# Patient Record
Sex: MALE | Race: WHITE | NOT HISPANIC OR LATINO
[De-identification: names, ages, dates, MRNs, and addresses within clinical notes are randomized per-mention and may not be internally consistent; named-entity substitution may affect disease eponyms.]

---

## 2017-11-06 ENCOUNTER — NEW PATIENT (OUTPATIENT)
Age: 52
End: 2017-11-06

## 2017-11-06 DIAGNOSIS — H25.13: ICD-10-CM

## 2017-11-06 DIAGNOSIS — H35.372: ICD-10-CM

## 2017-11-06 DIAGNOSIS — Z98.890: ICD-10-CM

## 2017-11-06 PROCEDURE — 99204 OFFICE O/P NEW MOD 45 MIN: CPT

## 2017-11-06 ASSESSMENT — VISUAL ACUITY
OS_PH: 20/100-2
OS_SC: 20/150+1
OD_GLARE: 20/25
OD_SC: 20/25-1

## 2017-11-06 ASSESSMENT — TONOMETRY
OD_IOP_MMHG: 18
OS_IOP_MMHG: 11

## 2017-12-04 ENCOUNTER — DIAGNOSTICS ONLY (OUTPATIENT)
Age: 52
End: 2017-12-04

## 2017-12-04 DIAGNOSIS — H25.13: ICD-10-CM

## 2017-12-04 DIAGNOSIS — H35.372: ICD-10-CM

## 2017-12-04 PROCEDURE — 92134 CPTRZ OPH DX IMG PST SGM RTA: CPT

## 2017-12-04 PROCEDURE — 92136 OPHTHALMIC BIOMETRY: CPT

## 2017-12-04 ASSESSMENT — KERATOMETRY
OS_AXISANGLE_DEGREES: 153
OD_K1POWER_DIOPTERS: 38.00
OS_AXISANGLE2_DEGREES: 063
OD_K2POWER_DIOPTERS: 38.75
OD_AXISANGLE_DEGREES: 42
OS_K1POWER_DIOPTERS: 38.00
OS_K2POWER_DIOPTERS: 38.75
OD_AXISANGLE2_DEGREES: 132

## 2017-12-05 ASSESSMENT — KERATOMETRY
OD_AXISANGLE2_DEGREES: 132
OD_K1POWER_DIOPTERS: 38.00
OS_K1POWER_DIOPTERS: 38.00
OS_K2POWER_DIOPTERS: 38.75
OD_AXISANGLE_DEGREES: 42
OS_AXISANGLE_DEGREES: 153
OD_K2POWER_DIOPTERS: 38.75
OS_AXISANGLE2_DEGREES: 063

## 2017-12-07 ENCOUNTER — SURGERY/PROCEDURE (OUTPATIENT)
Age: 52
End: 2017-12-07

## 2017-12-07 DIAGNOSIS — H25.812: ICD-10-CM

## 2017-12-07 PROCEDURE — 66984 XCAPSL CTRC RMVL W/O ECP: CPT

## 2018-02-14 ENCOUNTER — POST-OP CHECK (OUTPATIENT)
Age: 53
End: 2018-02-14

## 2018-02-14 DIAGNOSIS — Z96.1: ICD-10-CM

## 2018-02-14 DIAGNOSIS — H52.13: ICD-10-CM

## 2018-02-14 PROCEDURE — 92015 DETERMINE REFRACTIVE STATE: CPT

## 2018-02-14 PROCEDURE — 99024 POSTOP FOLLOW-UP VISIT: CPT

## 2018-02-14 ASSESSMENT — KERATOMETRY
OD_AXISANGLE2_DEGREES: 135
OS_K2POWER_DIOPTERS: 38.75
OS_K1POWER_DIOPTERS: 38.25
OS_AXISANGLE2_DEGREES: 063
OD_K1POWER_DIOPTERS: 38.25
OS_AXISANGLE_DEGREES: 153
OD_K2POWER_DIOPTERS: 39.00
OD_AXISANGLE_DEGREES: 45

## 2018-02-14 ASSESSMENT — VISUAL ACUITY
OD_PH: 20/30
OD_SC: 20/40+1
OS_SC: J16
OD_SC: J2
OS_SC: 20/150-1
OS_PH: 20/150+1

## 2018-02-14 ASSESSMENT — TONOMETRY
OS_IOP_MMHG: 10
OD_IOP_MMHG: 10

## 2019-02-25 ENCOUNTER — ESTABLISHED (OUTPATIENT)
Age: 54
End: 2019-02-25

## 2019-02-25 DIAGNOSIS — H25.11: ICD-10-CM

## 2019-02-25 DIAGNOSIS — Z98.890: ICD-10-CM

## 2019-02-25 DIAGNOSIS — H35.372: ICD-10-CM

## 2019-02-25 DIAGNOSIS — H25.812: ICD-10-CM

## 2019-02-25 DIAGNOSIS — H52.13: ICD-10-CM

## 2019-02-25 DIAGNOSIS — Z96.1: ICD-10-CM

## 2019-02-25 PROCEDURE — 92015 DETERMINE REFRACTIVE STATE: CPT

## 2019-02-25 PROCEDURE — 99214 OFFICE O/P EST MOD 30 MIN: CPT

## 2019-02-25 ASSESSMENT — TONOMETRY
OS_IOP_MMHG: 11
OD_IOP_MMHG: 11

## 2019-02-25 ASSESSMENT — VISUAL ACUITY
OS_CC: 20/70-2
OS_SC: 20/100
OD_CC: 20/25-2
OD_SC: 20/30-1

## 2019-07-10 ENCOUNTER — APPOINTMENT (RX ONLY)
Dept: URBAN - METROPOLITAN AREA CLINIC 37 | Facility: CLINIC | Age: 54
Setting detail: DERMATOLOGY
End: 2019-07-10

## 2019-07-10 DIAGNOSIS — Z85.828 PERSONAL HISTORY OF OTHER MALIGNANT NEOPLASM OF SKIN: ICD-10-CM

## 2019-07-10 DIAGNOSIS — D22 MELANOCYTIC NEVI: ICD-10-CM

## 2019-07-10 DIAGNOSIS — L72.8 OTHER FOLLICULAR CYSTS OF THE SKIN AND SUBCUTANEOUS TISSUE: ICD-10-CM

## 2019-07-10 DIAGNOSIS — L91.8 OTHER HYPERTROPHIC DISORDERS OF THE SKIN: ICD-10-CM

## 2019-07-10 PROBLEM — D48.5 NEOPLASM OF UNCERTAIN BEHAVIOR OF SKIN: Status: ACTIVE | Noted: 2019-07-10

## 2019-07-10 PROBLEM — I10 ESSENTIAL (PRIMARY) HYPERTENSION: Status: ACTIVE | Noted: 2019-07-10

## 2019-07-10 PROBLEM — D22.5 MELANOCYTIC NEVI OF TRUNK: Status: ACTIVE | Noted: 2019-07-10

## 2019-07-10 PROBLEM — D23.72 OTHER BENIGN NEOPLASM OF SKIN OF LEFT LOWER LIMB, INCLUDING HIP: Status: ACTIVE | Noted: 2019-07-10

## 2019-07-10 PROBLEM — Z85.79 PERSONAL HISTORY OF OTHER MALIGNANT NEOPLASMS OF LYMPHOID, HEMATOPOIETIC AND RELATED TISSUES: Status: ACTIVE | Noted: 2019-07-10

## 2019-07-10 PROCEDURE — 11102 TANGNTL BX SKIN SINGLE LES: CPT

## 2019-07-10 PROCEDURE — ? BIOPSY BY SHAVE METHOD

## 2019-07-10 PROCEDURE — 99203 OFFICE O/P NEW LOW 30 MIN: CPT | Mod: 25

## 2019-07-10 PROCEDURE — ? COUNSELING

## 2019-07-10 ASSESSMENT — LOCATION ZONE DERM
LOCATION ZONE: NECK
LOCATION ZONE: TRUNK

## 2019-07-10 ASSESSMENT — LOCATION SIMPLE DESCRIPTION DERM
LOCATION SIMPLE: NECK
LOCATION SIMPLE: RIGHT UPPER BACK

## 2019-07-10 ASSESSMENT — LOCATION DETAILED DESCRIPTION DERM
LOCATION DETAILED: RIGHT SUPERIOR UPPER BACK
LOCATION DETAILED: RIGHT SUPERIOR POSTERIOR NECK

## 2019-07-10 NOTE — PROCEDURE: BIOPSY BY SHAVE METHOD
Silver Nitrate Text: The wound bed was treated with silver nitrate after the biopsy was performed.
Destruction After The Procedure: No
Size Of Lesion In Cm: 0
Electrodesiccation Text: The wound bed was treated with electrodesiccation after the biopsy was performed.
Consent: Verbal consent was obtained and risks were reviewed including but not limited to scarring, infection, bleeding, scabbing, incomplete removal, nerve damage and allergy to anesthesia. Also risk that hair will not grow through area.
Dressing: bandage
Depth Of Biopsy: dermis
Billing Type: Third-Party Bill
Hemostasis: Electrodesiccation
Lab: [id number]
Electrodesiccation And Curettage Text: The wound bed was treated with electrodesiccation and curettage after the biopsy was performed.
Biopsy Method: Acu-Razor
Biopsy Type: H and E
Render Post-Care Instructions In Note?: yes
Notification Instructions: Patient will be notified of biopsy results. However, patient instructed to call the office if not contacted within 2 weeks.
Curettage Text: The wound bed was treated with curettage after the biopsy was done.
Detail Level: Detailed
Path Notes (To The Dermatopathologist): .
Cryotherapy Text: The wound bed was treated with cryotherapy after the biopsy was performed.
Post-Care Instructions: Patient is to keep the biopsy site dry overnight, and then wash daily with mild soap and water, apply petroleum jelly, and cover with a bandaid once daily until healed, usually 1-2 weeks.
Type Of Destruction Used: Curettage
Anesthesia Type: 1% lidocaine without epinephrine
Anesthesia Volume In Cc (Will Not Render If 0): 0.3
Wound Care: Vaseline
Lab Facility: [id number]

## 2019-07-10 NOTE — PROCEDURE: MIPS QUALITY
Detail Level: Detailed
Quality 110: Preventive Care And Screening: Influenza Immunization: Influenza immunization was not ordered or administered, reason not given
Quality 226: Preventive Care And Screening: Tobacco Use: Screening And Cessation Intervention: Patient screened for tobacco use and is an ex/non-smoker
Quality 111:Pneumonia Vaccination Status For Older Adults: Pneumococcal Vaccination not Administered or Previously Received, Reason not Otherwise Specified
Quality 431: Preventive Care And Screening: Unhealthy Alcohol Use - Screening: Unhealthy alcohol use screening not performed, reason not otherwise specified